# Patient Record
Sex: FEMALE | Race: BLACK OR AFRICAN AMERICAN | Employment: FULL TIME | URBAN - METROPOLITAN AREA
[De-identification: names, ages, dates, MRNs, and addresses within clinical notes are randomized per-mention and may not be internally consistent; named-entity substitution may affect disease eponyms.]

---

## 2017-03-02 DIAGNOSIS — J45.909 ASTHMA IN PEDIATRIC PATIENT, UNSPECIFIED ASTHMA SEVERITY, UNCOMPLICATED: ICD-10-CM

## 2017-03-03 RX ORDER — ALBUTEROL SULFATE 90 UG/1
AEROSOL, METERED RESPIRATORY (INHALATION)
Qty: 18 INHALER | Refills: 0 | OUTPATIENT
Start: 2017-03-03

## 2017-03-08 NOTE — TELEPHONE ENCOUNTER
Pt needs to be called and informed that an appointment is necessary. I am not currently in the office and will route to [facility] to call pt.   Pt's last appointment was w/ Dr. Tonio Lao who is not a regular physician of [facility]

## 2018-07-20 ENCOUNTER — HOSPITAL ENCOUNTER (OUTPATIENT)
Dept: LAB | Age: 23
Discharge: HOME | End: 2018-07-20

## 2019-03-10 ENCOUNTER — HOSPITAL ENCOUNTER (EMERGENCY)
Age: 24
Discharge: HOME | End: 2019-03-10
Attending: STUDENT IN AN ORGANIZED HEALTH CARE EDUCATION/TRAINING PROGRAM
Payer: MEDICAID

## 2019-03-10 VITALS
TEMPERATURE: 99.3 F | BODY MASS INDEX: 47.09 KG/M2 | DIASTOLIC BLOOD PRESSURE: 90 MMHG | RESPIRATION RATE: 20 BRPM | OXYGEN SATURATION: 100 % | WEIGHT: 293 LBS | HEART RATE: 110 BPM | SYSTOLIC BLOOD PRESSURE: 144 MMHG | HEIGHT: 66 IN

## 2019-03-10 DIAGNOSIS — R11.10 VOMITING AND DIARRHEA: Primary | ICD-10-CM

## 2019-03-10 DIAGNOSIS — R19.7 VOMITING AND DIARRHEA: Primary | ICD-10-CM

## 2019-03-10 LAB
ALBUMIN SERPL-MCNC: 3.7 G/DL (ref 3.5–5)
ALBUMIN/GLOB SERPL: 0.9 {RATIO} (ref 1.1–2.2)
ALP SERPL-CCNC: 56 U/L (ref 45–117)
ALT SERPL-CCNC: 28 U/L (ref 12–78)
ANION GAP SERPL CALC-SCNC: 8 MMOL/L (ref 5–15)
APPEARANCE UR: ABNORMAL
AST SERPL-CCNC: 23 U/L (ref 15–37)
BACTERIA URNS QL MICRO: NEGATIVE /HPF
BASOPHILS # BLD: 0 K/UL (ref 0–0.1)
BASOPHILS NFR BLD: 0 % (ref 0–1)
BILIRUB SERPL-MCNC: 0.4 MG/DL (ref 0.2–1)
BILIRUB UR QL: NEGATIVE
BUN SERPL-MCNC: 13 MG/DL (ref 6–20)
BUN/CREAT SERPL: 16 (ref 12–20)
CALCIUM SERPL-MCNC: 8.2 MG/DL (ref 8.5–10.1)
CHLORIDE SERPL-SCNC: 104 MMOL/L (ref 97–108)
CO2 SERPL-SCNC: 26 MMOL/L (ref 21–32)
COLOR UR: ABNORMAL
CREAT SERPL-MCNC: 0.83 MG/DL (ref 0.55–1.02)
DIFFERENTIAL METHOD BLD: ABNORMAL
EOSINOPHIL # BLD: 0.1 K/UL (ref 0–0.4)
EOSINOPHIL NFR BLD: 1 % (ref 0–7)
EPITH CASTS URNS QL MICRO: NORMAL /LPF
ERYTHROCYTE [DISTWIDTH] IN BLOOD BY AUTOMATED COUNT: 12.5 % (ref 11.5–14.5)
GLOBULIN SER CALC-MCNC: 4.1 G/DL (ref 2–4)
GLUCOSE SERPL-MCNC: 87 MG/DL (ref 65–100)
GLUCOSE UR STRIP.AUTO-MCNC: NEGATIVE MG/DL
HCG UR QL: NEGATIVE
HCT VFR BLD AUTO: 41.6 % (ref 35–47)
HGB BLD-MCNC: 13.6 G/DL (ref 11.5–16)
HGB UR QL STRIP: ABNORMAL
IMM GRANULOCYTES # BLD AUTO: 0 K/UL (ref 0–0.04)
IMM GRANULOCYTES NFR BLD AUTO: 0 % (ref 0–0.5)
KETONES UR QL STRIP.AUTO: NEGATIVE MG/DL
LEUKOCYTE ESTERASE UR QL STRIP.AUTO: ABNORMAL
LIPASE SERPL-CCNC: 117 U/L (ref 73–393)
LYMPHOCYTES # BLD: 1 K/UL (ref 0.8–3.5)
LYMPHOCYTES NFR BLD: 8 % (ref 12–49)
MAGNESIUM SERPL-MCNC: 1.7 MG/DL (ref 1.6–2.4)
MCH RBC QN AUTO: 31.7 PG (ref 26–34)
MCHC RBC AUTO-ENTMCNC: 32.7 G/DL (ref 30–36.5)
MCV RBC AUTO: 97 FL (ref 80–99)
MONOCYTES # BLD: 0.8 K/UL (ref 0–1)
MONOCYTES NFR BLD: 7 % (ref 5–13)
NEUTS SEG # BLD: 10.5 K/UL (ref 1.8–8)
NEUTS SEG NFR BLD: 84 % (ref 32–75)
NITRITE UR QL STRIP.AUTO: NEGATIVE
NRBC # BLD: 0 K/UL (ref 0–0.01)
NRBC BLD-RTO: 0 PER 100 WBC
PH UR STRIP: 5 [PH] (ref 5–8)
PLATELET # BLD AUTO: 322 K/UL (ref 150–400)
PMV BLD AUTO: 10.2 FL (ref 8.9–12.9)
POTASSIUM SERPL-SCNC: 3.8 MMOL/L (ref 3.5–5.1)
PROT SERPL-MCNC: 7.8 G/DL (ref 6.4–8.2)
PROT UR STRIP-MCNC: NEGATIVE MG/DL
RBC # BLD AUTO: 4.29 M/UL (ref 3.8–5.2)
RBC #/AREA URNS HPF: NORMAL /HPF (ref 0–5)
SODIUM SERPL-SCNC: 138 MMOL/L (ref 136–145)
SP GR UR REFRACTOMETRY: 1.03 (ref 1–1.03)
UROBILINOGEN UR QL STRIP.AUTO: 0.2 EU/DL (ref 0.2–1)
WBC # BLD AUTO: 12.4 K/UL (ref 3.6–11)
WBC URNS QL MICRO: NORMAL /HPF (ref 0–4)

## 2019-03-10 PROCEDURE — 74011000250 HC RX REV CODE- 250: Performed by: STUDENT IN AN ORGANIZED HEALTH CARE EDUCATION/TRAINING PROGRAM

## 2019-03-10 PROCEDURE — 36415 COLL VENOUS BLD VENIPUNCTURE: CPT

## 2019-03-10 PROCEDURE — 96375 TX/PRO/DX INJ NEW DRUG ADDON: CPT

## 2019-03-10 PROCEDURE — 74011250637 HC RX REV CODE- 250/637: Performed by: STUDENT IN AN ORGANIZED HEALTH CARE EDUCATION/TRAINING PROGRAM

## 2019-03-10 PROCEDURE — 81001 URINALYSIS AUTO W/SCOPE: CPT

## 2019-03-10 PROCEDURE — 74011250636 HC RX REV CODE- 250/636: Performed by: STUDENT IN AN ORGANIZED HEALTH CARE EDUCATION/TRAINING PROGRAM

## 2019-03-10 PROCEDURE — 80053 COMPREHEN METABOLIC PANEL: CPT

## 2019-03-10 PROCEDURE — 85025 COMPLETE CBC W/AUTO DIFF WBC: CPT

## 2019-03-10 PROCEDURE — 96374 THER/PROPH/DIAG INJ IV PUSH: CPT

## 2019-03-10 PROCEDURE — 83735 ASSAY OF MAGNESIUM: CPT

## 2019-03-10 PROCEDURE — 96361 HYDRATE IV INFUSION ADD-ON: CPT

## 2019-03-10 PROCEDURE — 99283 EMERGENCY DEPT VISIT LOW MDM: CPT

## 2019-03-10 PROCEDURE — 81025 URINE PREGNANCY TEST: CPT

## 2019-03-10 PROCEDURE — 83690 ASSAY OF LIPASE: CPT

## 2019-03-10 RX ORDER — ONDANSETRON 2 MG/ML
4 INJECTION INTRAMUSCULAR; INTRAVENOUS
Status: COMPLETED | OUTPATIENT
Start: 2019-03-10 | End: 2019-03-10

## 2019-03-10 RX ORDER — KETOROLAC TROMETHAMINE 30 MG/ML
30 INJECTION, SOLUTION INTRAMUSCULAR; INTRAVENOUS
Status: COMPLETED | OUTPATIENT
Start: 2019-03-10 | End: 2019-03-10

## 2019-03-10 RX ORDER — FAMOTIDINE 10 MG/ML
20 INJECTION INTRAVENOUS
Status: COMPLETED | OUTPATIENT
Start: 2019-03-10 | End: 2019-03-10

## 2019-03-10 RX ORDER — ONDANSETRON 4 MG/1
4 TABLET, FILM COATED ORAL
Qty: 12 TAB | Refills: 0 | Status: SHIPPED | OUTPATIENT
Start: 2019-03-10 | End: 2021-01-26 | Stop reason: ALTCHOICE

## 2019-03-10 RX ORDER — NORGESTIMATE AND ETHINYL ESTRADIOL 0.25-0.035
1 KIT ORAL DAILY
COMMUNITY

## 2019-03-10 RX ORDER — FAMOTIDINE 20 MG/1
20 TABLET, FILM COATED ORAL 2 TIMES DAILY
Qty: 20 TAB | Refills: 0 | Status: SHIPPED | OUTPATIENT
Start: 2019-03-10 | End: 2019-03-20

## 2019-03-10 RX ADMIN — ONDANSETRON 4 MG: 2 INJECTION INTRAMUSCULAR; INTRAVENOUS at 19:07

## 2019-03-10 RX ADMIN — FAMOTIDINE 20 MG: 10 INJECTION, SOLUTION INTRAVENOUS at 20:22

## 2019-03-10 RX ADMIN — KETOROLAC TROMETHAMINE 30 MG: 30 INJECTION INTRAMUSCULAR; INTRAVENOUS at 19:08

## 2019-03-10 RX ADMIN — LIDOCAINE HYDROCHLORIDE 40 ML: 20 SOLUTION ORAL; TOPICAL at 20:20

## 2019-03-10 RX ADMIN — SODIUM CHLORIDE, SODIUM LACTATE, POTASSIUM CHLORIDE, AND CALCIUM CHLORIDE 1000 ML: 600; 310; 30; 20 INJECTION, SOLUTION INTRAVENOUS at 19:07

## 2019-03-10 NOTE — ED NOTES
Plan of care and all test/meds ordered explained to pt. Good understanding and agreement with plan was verbalized. IVF to gravity without difficulty. Call bell within reach.

## 2019-03-10 NOTE — LETTER
[facility] EMERGENCY DEPT 
[address] [place] [address] [phone] Work/School Note Date: 3/10/2019 To Whom It May concern: 
 
Adriana Garcia was seen and treated today in the emergency room by the following provider(s): 
Attending Provider: Maynor Bach MD.   
 
Adriana Garcia may return to school on 03/11/19. Sincerely, Deliah Kehr, MD

## 2019-03-10 NOTE — ED PROVIDER NOTES
26 y.o. female with past medical history significant for Morbid obesity and Asthma who presents from home via private vehicle with chief complaint of abdominal pain. Pt reports onset \"a day and half ago\" of \"6/10\" L sided abdominal pain accompanied by nausea, vomiting x2, diarrhea x4, and HA. Pt reports last episode of vomiting was \"25 minutes ago\". Pt reports she was not able to tolerate PO today. Pt denies previous history of current symptoms. 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 Pt denies any history of abdominal surgeries. Pt denies any dysuria, hematemesis, lightheadedness, cough, SOB or sore throat. There are no other acute medical concerns at this time. Social hx: Non smoker; No EtOH use    PCP: Mack Burks MD    Note written by Moses Stafford, as dictated by Julio Oliva MD 6:20 PM        The history is provided by the patient. No  was used.         Past Medical History:   Diagnosis Date    Asthma     Morbid obesity with BMI of 50.0-59.9, adult ([facility] 5/4/2015       Past Surgical History:   Procedure Laterality Date    HX HEENT      keloid removal         Family History:   Problem Relation Age of Onset    Depression Sister        Social History     Socioeconomic History    Marital status: UNKNOWN     Spouse name: Not on file    Number of children: Not on file    Years of education: Not on file    Highest education level: Not on file   Social Needs    Financial resource strain: Not on file    Food insecurity - worry: Not on file    Food insecurity - inability: Not on file   [employer] needs - medical: Not on file   [employer] needs - non-medical: Not on file   Occupational History    Not on file   Tobacco Use    Smoking status: Never Smoker   Substance and Sexual Activity    Alcohol use: No    Drug use: No    Sexual activity: Not on file   Other Topics Concern    Not on file   Social History Narrative  Not on file         ALLERGIES: Patient has no known allergies. Review of Systems   Constitutional: Negative for chills and fever. HENT: Negative for sore throat. Eyes: Negative for photophobia. Respiratory: Negative for cough and shortness of breath. Cardiovascular: Negative for chest pain. Gastrointestinal: Positive for abdominal distention, diarrhea, nausea and vomiting. Negative for abdominal pain. Genitourinary: Negative for dysuria. Musculoskeletal: Negative for back pain. Neurological: Positive for headaches. Negative for light-headedness. Psychiatric/Behavioral: Negative for confusion. All other systems reviewed and are negative. There were no vitals filed for this visit. Physical Exam   Constitutional: No distress. Pt appears comfortable and is pleasant. Pt is morbidly obese. HENT:   Head: Normocephalic. Eyes: Pupils are equal, round, and reactive to light. Cardiovascular: Normal rate, regular rhythm and intact distal pulses. Pulmonary/Chest: Effort normal.   Abdominal: She exhibits no distension. There is tenderness in the left upper quadrant. There is no rebound and no guarding. Neurological: She is alert. Skin: Skin is warm. Psychiatric: Her behavior is normal.   Nursing note and vitals reviewed. Note written by Moses Durham, as dictated by Ray Murguia MD 6:20 PM    PANCHITO Thompson is a 26 y.o. female presenting with LUQ, intermittent emesis and diarrhea for over 24 hrs, no fevers, benign abdominal exam.    Differential includes gastroenteritis, PUD, pancreatitis, less likely cholecystitis or bowel obstruction. Course: stable. Dispo: home. 7:30 PM  Return precautions for worsening symptoms, specifically inability to tolerate PO, high fevers, constipation were explained verbally and in writing. Pt was asked to return to the ED immediately for any new, worsening or concerning symptoms.  Pt was in agreement, endorsed understanding, and questions were answered. Dalila Ruano was instructed to call Suzette Ricardo MD for an urgent hospital follow up appointment. Malik Alberts M.D.       Procedures

## 2019-03-11 NOTE — DISCHARGE INSTRUCTIONS
Drink plenty of fluids to prevent dehydration, enough so that your urine is light yellow or clear like water. Choose water and other caffeine-free clear liquids until you feel better. If you have kidney, heart, or liver disease and have to limit fluids, talk with your doctor before you increase your fluid intake. Drink fluids slowly, in frequent, small amounts, because drinking too much too fast can cause vomiting. Begin eating mild foods, such as dry toast, yogurt, applesauce, bananas, and rice. Avoid spicy, hot, or high-fat foods, and do not drink alcohol or caffeine for a day or two. Do not drink milk or eat ice cream until you are feeling better. How to prevent gastroenteritis  Keep hot foods hot and cold foods cold. Do not eat meats, dressings, salads, or other foods that have been kept at room temperature for more than 2 hours. Use a thermometer to check your refrigerator. It should be between 34°F and 40°F.  Defrost meats in the refrigerator or microwave, not on the kitchen counter. Keep your hands and your kitchen clean. Wash your hands, cutting boards, and countertops with hot soapy water frequently. Cook meat until it is well done. Do not eat raw eggs or uncooked sauces made with raw eggs. Do not take chances. If food looks or tastes spoiled, throw it out. When should you call for help? Call 911 anytime you think you may need emergency care. For example, call if:    You vomit blood or what looks like coffee grounds. You passed out (lost consciousness). You pass maroon or very bloody stools. Call your doctor now or seek immediate medical care if:    You have severe belly pain. You have signs of needing more fluids. You have sunken eyes, a dry mouth, and pass only a little dark urine. You feel like you are going to faint. You have increased belly pain that does not go away in 1 to 2 days. You have new or increased nausea, or you are vomiting.      You have a new or higher fever.     Your stools are black and tarlike or have streaks of blood. Watch closely for changes in your health, and be sure to contact your doctor if:    You are dizzy or lightheaded. You urinate less than usual, or your urine is dark yellow or brown. You do not feel better with each day that goes by.

## 2019-03-11 NOTE — PROGRESS NOTES
[facility]: MED RECONCILIATION    Medications added:     · Sprintec or generic equivalent (oral contraceptives) - Started week of placebo pills on 3/9/19. Next active pill will be due on 3/16/19. Medications removed:    · Cyclobenzaprine - old Rx  · Flonase    Medications adjusted:    · Ibuprofen 400 mg PO Q8H PRN cramps    Information obtained from: patient, Rx query    Significant PMH/Disease States:   Past Medical History:   Diagnosis Date    Asthma     Morbid obesity with BMI of 50.0-59.9, adult ([facility] 5/4/2015     Chief Complaint for this Admission:   Chief Complaint   Patient presents with    Abdominal Pain     Allergies: Updated allergy list to include codeine (itching)    Prior to Admission Medications:     Medication Documentation Review Audit       Reviewed by Deny Hatch, PHARMD (Pharmacist) on 03/10/19 at 2004      Medication Sig Documenting Provider Last Dose Status Taking? albuterol (PROVENTIL HFA, VENTOLIN HFA, PROAIR HFA) 90 mcg/actuation inhaler Take 2 Puffs by inhalation every six (6) hours as needed for Wheezing. Diamond Gilbert MD  Active Yes   ibuprofen (ADVIL) 200 mg tablet Take 400 mg by mouth every eight (8) hours as needed (cramps). Provider, Historical  Active Yes   norgestimate-ethinyl estradiol (SPRINTEC, 28,) 0.25-35 mg-mcg tab Take 1 Tab by mouth daily. Provider, Historical 3/10/2019 am Active Yes           Med Note Cameron Dunaway, [address] [place]   Sun Mar 10, 2019  8:04 PM) Started week of placebo pills on 3/9/19. Next due active pill will be due 3/16/19. Thank you for the consult,  Arnoldo AGUAYO [facility]

## 2019-03-11 NOTE — ED NOTES
Provider has reviewed discharge instructions with the patient. The patient verbalized understanding. Pt ambulates toward exit without difficulty.

## 2019-04-25 ENCOUNTER — HOSPITAL ENCOUNTER (EMERGENCY)
Age: 24
Discharge: HOME | End: 2019-04-25
Attending: EMERGENCY MEDICINE | Admitting: EMERGENCY MEDICINE
Payer: MEDICAID

## 2019-04-25 VITALS
WEIGHT: 293 LBS | TEMPERATURE: 98.9 F | RESPIRATION RATE: 18 BRPM | HEART RATE: 97 BPM | DIASTOLIC BLOOD PRESSURE: 86 MMHG | HEIGHT: 67 IN | SYSTOLIC BLOOD PRESSURE: 186 MMHG | OXYGEN SATURATION: 100 % | BODY MASS INDEX: 45.99 KG/M2

## 2019-04-25 DIAGNOSIS — M54.50 ACUTE LOW BACK PAIN WITHOUT SCIATICA, UNSPECIFIED BACK PAIN LATERALITY: Primary | ICD-10-CM

## 2019-04-25 LAB
APPEARANCE UR: CLEAR
BACTERIA URNS QL MICRO: NEGATIVE /HPF
BILIRUB UR QL: NEGATIVE
COLOR UR: ABNORMAL
EPITH CASTS URNS QL MICRO: ABNORMAL /LPF
GLUCOSE UR STRIP.AUTO-MCNC: NEGATIVE MG/DL
HCG UR QL: NEGATIVE
HGB UR QL STRIP: ABNORMAL
HYALINE CASTS URNS QL MICRO: ABNORMAL /LPF (ref 0–5)
KETONES UR QL STRIP.AUTO: ABNORMAL MG/DL
LEUKOCYTE ESTERASE UR QL STRIP.AUTO: NEGATIVE
NITRITE UR QL STRIP.AUTO: NEGATIVE
PH UR STRIP: 5.5 [PH] (ref 5–8)
PROT UR STRIP-MCNC: NEGATIVE MG/DL
RBC #/AREA URNS HPF: ABNORMAL /HPF (ref 0–5)
SP GR UR REFRACTOMETRY: 1.03 (ref 1–1.03)
UR CULT HOLD, URHOLD: NORMAL
UROBILINOGEN UR QL STRIP.AUTO: 1 EU/DL (ref 0.2–1)
WBC URNS QL MICRO: ABNORMAL /HPF (ref 0–4)

## 2019-04-25 PROCEDURE — 74011250636 HC RX REV CODE- 250/636: Performed by: PHYSICIAN ASSISTANT

## 2019-04-25 PROCEDURE — 96372 THER/PROPH/DIAG INJ SC/IM: CPT

## 2019-04-25 PROCEDURE — 99283 EMERGENCY DEPT VISIT LOW MDM: CPT

## 2019-04-25 PROCEDURE — 81001 URINALYSIS AUTO W/SCOPE: CPT

## 2019-04-25 PROCEDURE — 81025 URINE PREGNANCY TEST: CPT

## 2019-04-25 RX ORDER — KETOROLAC TROMETHAMINE 30 MG/ML
30 INJECTION, SOLUTION INTRAMUSCULAR; INTRAVENOUS
Status: COMPLETED | OUTPATIENT
Start: 2019-04-25 | End: 2019-04-25

## 2019-04-25 RX ORDER — CYCLOBENZAPRINE HCL 10 MG
10 TABLET ORAL
Qty: 8 TAB | Refills: 0 | Status: SHIPPED | OUTPATIENT
Start: 2019-04-25 | End: 2021-01-26 | Stop reason: ALTCHOICE

## 2019-04-25 RX ORDER — IBUPROFEN 800 MG/1
800 TABLET ORAL
Qty: 20 TAB | Refills: 0 | Status: SHIPPED | OUTPATIENT
Start: 2019-04-25

## 2019-04-25 RX ADMIN — KETOROLAC TROMETHAMINE 30 MG: 30 INJECTION, SOLUTION INTRAMUSCULAR at 21:36

## 2019-04-25 NOTE — LETTER
Mary Carcamo 
[facility] EMERGENCY DEPT 
[address] [facility] [place] [address] [phone] Work/School Note Date: 4/25/2019 To Whom It May concern: 
 
Manual Sarah was seen and treated today in the emergency room by the following provider(s): 
Attending Provider: Arvind Cannon DO Physician Assistant: Sandra Read PA-C. Nicholas Smith may return to school on 4/26/2019. Sincerely, Jose Abrams RN

## 2019-04-26 NOTE — DISCHARGE INSTRUCTIONS
Patient Education        Back Pain: Care Instructions  Your Care Instructions    Back pain has many possible causes. It is often related to problems with muscles and ligaments of the back. It may also be related to problems with the nerves, discs, or bones of the back. Moving, lifting, standing, sitting, or sleeping in an awkward way can strain the back. Sometimes you don't notice the injury until later. Arthritis is another common cause of back pain. Although it may hurt a lot, back pain usually improves on its own within several weeks. Most people recover in 12 weeks or less. Using good home treatment and being careful not to stress your back can help you feel better sooner. Follow-up care is a key part of your treatment and safety. Be sure to make and go to all appointments, and call your doctor if you are having problems. It's also a good idea to know your test results and keep a list of the medicines you take. How can you care for yourself at home? · Sit or lie in positions that are most comfortable and reduce your pain. Try one of these positions when you lie down:  ? Lie on your back with your knees bent and supported by large pillows. ? Lie on the floor with your legs on the seat of a sofa or chair. ? Lie on your side with your knees and hips bent and a pillow between your legs. ? Lie on your stomach if it does not make pain worse. · Do not sit up in bed, and avoid soft couches and twisted positions. Bed rest can help relieve pain at first, but it delays healing. Avoid bed rest after the first day of back pain. · Change positions every 30 minutes. If you must sit for long periods of time, take breaks from sitting. Get up and walk around, or lie in a comfortable position. · Try using a heating pad on a low or medium setting for 15 to 20 minutes every 2 or 3 hours. Try a warm shower in place of one session with the heating pad. · You can also try an ice pack for 10 to 15 minutes every 2 to 3 hours. Put a thin cloth between the ice pack and your skin. · Take pain medicines exactly as directed. ? If the doctor gave you a prescription medicine for pain, take it as prescribed. ? If you are not taking a prescription pain medicine, ask your doctor if you can take an over-the-counter medicine. · Take short walks several times a day. You can start with 5 to 10 minutes, 3 or 4 times a day, and work up to longer walks. Walk on level surfaces and avoid hills and stairs until your back is better. · Return to work and other activities as soon as you can. Continued rest without activity is usually not good for your back. · To prevent future back pain, do exercises to stretch and strengthen your back and stomach. Learn how to use good posture, safe lifting techniques, and proper body mechanics. When should you call for help? Call your doctor now or seek immediate medical care if:    · You have new or worsening numbness in your legs.     · You have new or worsening weakness in your legs. (This could make it hard to stand up.)     · You lose control of your bladder or bowels.    Watch closely for changes in your health, and be sure to contact your doctor if:    · You have a fever, lose weight, or don't feel well.     · You do not get better as expected. Where can you learn more? Go to http://vito-augie.info/. Enter M475 in the search box to learn more about \"Back Pain: Care Instructions. \"  Current as of: September 20, 2018  Content Version: 11.9  © 6767-3337 [employer]. Care instructions adapted under license by [employer] (which disclaims liability or warranty for this information). If you have questions about a medical condition or this instruction, always ask your healthcare professional. [place] [address] any warranty or liability for your use of this information.          Patient Education        Learning About Relief for Back Pain  What is back tension and strain? Back strain happens when you overstretch, or pull, a muscle in your back. You may hurt your back in an accident or when you exercise or lift something. Most back pain will get better with rest and time. You can take care of yourself at home to help your back heal.  What can you do first to relieve back pain? When you first feel back pain, try these steps:  · Walk. Take a short walk (10 to 20 minutes) on a level surface (no slopes, hills, or stairs) every 2 to 3 hours. Walk only distances you can manage without pain, especially leg pain. · Relax. Find a comfortable position for rest. Some people are comfortable on the floor or a medium-firm bed with a small pillow under their head and another under their knees. Some people prefer to lie on their side with a pillow between their knees. Don't stay in one position for too long. · Try heat or ice. Try using a heating pad on a low or medium setting, or take a warm shower, for 15 to 20 minutes every 2 to 3 hours. Or you can buy single-use heat wraps that last up to 8 hours. You can also try an ice pack for 10 to 15 minutes every 2 to 3 hours. You can use an ice pack or a bag of frozen vegetables wrapped in a thin towel. There is not strong evidence that either heat or ice will help, but you can try them to see if they help. You may also want to try switching between heat and cold. · Take pain medicine exactly as directed. ? If the doctor gave you a prescription medicine for pain, take it as prescribed. ? If you are not taking a prescription pain medicine, ask your doctor if you can take an over-the-counter medicine. What else can you do? · Stretch and exercise. Exercises that increase flexibility may relieve your pain and make it easier for your muscles to keep your spine in a good, neutral position. And don't forget to keep walking. · Do self-massage.  You can use self-massage to unwind after work or school or to energize yourself in the morning. You can easily massage your feet, hands, or neck. Self-massage works best if you are in comfortable clothes and are sitting or lying in a comfortable position. Use oil or lotion to massage bare skin. · Reduce stress. Back pain can lead to a vicious Teller: Distress about the pain tenses the muscles in your back, which in turn causes more pain. Learn how to relax your mind and your muscles to lower your stress. Where can you learn more? Go to http://vito-augie.info/. Enter R190 in the search box to learn more about \"Learning About Relief for Back Pain. \"  Current as of: September 20, 2018  Content Version: 11.9  © 8751-1318 [facility], [employer]. Care instructions adapted under license by [employer] (which disclaims liability or warranty for this information). If you have questions about a medical condition or this instruction, always ask your healthcare professional. [address] any warranty or liability for your use of this information.

## 2019-04-26 NOTE — ED PROVIDER NOTES
26 y.o. female with past medical history significant for asthma, morbid obesity, who presents from home with chief complaint of back pain. Pt states for the past 3 days she has had persistent back pain. She reports sharp shooting mid-back pain upon waking x3 days ago. She notes the pain is exacerbated with quick movements. She also reports the pain is worse with awaking and going to sleep at night. Her current Mattress is only 3year old. Pt reports taking x2 Advil yesterday without any relief of pain. Of note, the pt also plays the [place] which weighs about 45 lbs and has played for the past 12 years. She denies any history of back pain. She denies any recent injuries or falls to contribute to the pain. Pt specifically denies any radiation to her legs, abdominal pain, nausea, vomiting, diarrhea, dysuria, or urinary frequency. Last menstrual cycle was 4/15. There are no other acute medical concerns at this time. PSHx: Significant for keloid removal 
Social Hx: negative tobacco use, negative EtOH use, negative Illicit Drug use PCP: Rayray Garcia MD 
 
Note written by Moses Chavez, as dictated by Treasure Morse PA-C 9:02 PM 
 
The history is provided by the patient. No  was used. Past Medical History:  
Diagnosis Date  Asthma  Morbid obesity with BMI of 50.0-59.9, adult ([facility] 5/4/2015 Past Surgical History:  
Procedure Laterality Date  HX HEENT    
 keloid removal  
 
   
Family History:  
Problem Relation Age of Onset  Depression Sister Social History Socioeconomic History  Marital status: SINGLE Spouse name: Not on file  Number of children: Not on file  Years of education: Not on file  Highest education level: Not on file Occupational History  Not on file Social Needs  Financial resource strain: Not on file  Food insecurity:  
  Worry: Not on file Inability: Not on file  Transportation needs: Medical: Not on file Non-medical: Not on file Tobacco Use  Smoking status: Never Smoker Substance and Sexual Activity  Alcohol use: No  
 Drug use: No  
 Sexual activity: Not on file Lifestyle  Physical activity:  
  Days per week: Not on file Minutes per session: Not on file  Stress: Not on file Relationships  Social connections:  
  Talks on phone: Not on file Gets together: Not on file Attends Y[employer] service: Not on file Active member of club or organization: Not on file Attends meetings of clubs or organizations: Not on file Relationship status: Not on file  Intimate partner violence:  
  Fear of current or ex partner: Not on file Emotionally abused: Not on file Physically abused: Not on file Forced sexual activity: Not on file Other Topics Concern  Not on file Social History Narrative  Not on file ALLERGIES: Codeine Review of Systems Constitutional: Negative for chills and fever. Gastrointestinal: Negative for abdominal pain, diarrhea, nausea and vomiting. Genitourinary: Negative for dysuria and frequency. Musculoskeletal: Positive for back pain. All other systems reviewed and are negative. Vitals:  
 04/25/19 2026 BP: 186/86 Pulse: 97 Resp: 18 Temp: 98.9 °F (37.2 °C) SpO2: 100% Weight: 145.2 kg (320 lb) Height: 5' 7\" (1.702 m) Physical Exam  
Constitutional: She is oriented to person, place, and time. She appears well-developed and well-nourished. She is active. Non-toxic appearance. No distress. AA female elevated BMI HENT:  
Head: Normocephalic and atraumatic. Eyes: Pupils are equal, round, and reactive to light. Conjunctivae are normal. Right eye exhibits no discharge. Left eye exhibits no discharge. Neck: Normal range of motion and full passive range of motion without pain. No tracheal tenderness present. Cardiovascular: Normal rate, regular rhythm, normal heart sounds, intact distal pulses and normal pulses. Exam reveals no gallop and no friction rub. No murmur heard. Pulmonary/Chest: Effort normal and breath sounds normal. No respiratory distress. She has no wheezes. She has no rales. She exhibits no tenderness. Abdominal: Soft. Bowel sounds are normal. She exhibits no distension. There is no tenderness. There is no rebound and no guarding. Musculoskeletal: Normal range of motion. She exhibits no edema or tenderness. No midline spinous process TTP; Neg SLR. NVI throughout. Neurological: She is alert and oriented to person, place, and time. She has normal strength. No cranial nerve deficit or sensory deficit. Coordination normal.  
Skin: Skin is warm, dry and intact. Capillary refill takes less than 2 seconds. No abrasion and no rash noted. She is not diaphoretic. No erythema. Psychiatric: She has a normal mood and affect. Her speech is normal and behavior is normal. Cognition and memory are normal.  
Nursing note and vitals reviewed. MDM Number of Diagnoses or Management Options Diagnosis management comments:  
Ddx: back strain / sprain, UTI Amount and/or Complexity of Data Reviewed Clinical lab tests: ordered and reviewed Review and summarize past medical records: yes Patient Progress Patient progress: stable Procedures 9:44 PM 
Patient's results have been reviewed with them. Patient and/or family have verbally conveyed their understanding and agreement of the patient's signs, symptoms, diagnosis, treatment and prognosis and additionally agree to follow up as recommended or return to the Emergency Room should their condition change prior to follow-up.   Discharge instructions have also been provided to the patient with some educational information regarding their diagnosis as well a list of reasons why they would want to return to the ER prior to their follow-up appointment should their condition change.

## 2019-11-25 ENCOUNTER — HOSPITAL ENCOUNTER (EMERGENCY)
Age: 24
Discharge: HOME | End: 2019-11-25
Attending: EMERGENCY MEDICINE
Payer: MEDICAID

## 2019-11-25 ENCOUNTER — APPOINTMENT (OUTPATIENT)
Dept: VASCULAR SURGERY | Age: 24
End: 2019-11-25
Attending: PHYSICIAN ASSISTANT
Payer: MEDICAID

## 2019-11-25 VITALS
DIASTOLIC BLOOD PRESSURE: 85 MMHG | WEIGHT: 290 LBS | OXYGEN SATURATION: 100 % | BODY MASS INDEX: 46.61 KG/M2 | RESPIRATION RATE: 18 BRPM | SYSTOLIC BLOOD PRESSURE: 139 MMHG | HEIGHT: 66 IN | TEMPERATURE: 99.2 F | HEART RATE: 98 BPM

## 2019-11-25 DIAGNOSIS — M25.561 ARTHRALGIA OF RIGHT LOWER LEG: Primary | ICD-10-CM

## 2019-11-25 PROCEDURE — 99283 EMERGENCY DEPT VISIT LOW MDM: CPT

## 2019-11-25 PROCEDURE — 93971 EXTREMITY STUDY: CPT

## 2019-11-25 RX ORDER — PREDNISONE 10 MG/1
TABLET ORAL
Qty: 48 TAB | Refills: 0 | Status: SHIPPED | OUTPATIENT
Start: 2019-11-25 | End: 2021-01-26 | Stop reason: ALTCHOICE

## 2019-11-25 RX ORDER — METHOCARBAMOL 500 MG/1
500 TABLET, FILM COATED ORAL 4 TIMES DAILY
Qty: 30 TAB | Refills: 0 | Status: SHIPPED | OUTPATIENT
Start: 2019-11-25 | End: 2019-12-05

## 2019-11-25 NOTE — DISCHARGE INSTRUCTIONS
Patient Education        Musculoskeletal Pain: Care Instructions  Your Care Instructions    Different problems with the bones, muscles, nerves, ligaments, and tendons in the body can cause pain. One or more areas of your body may ache or burn. Or they may feel tired, stiff, or sore. The medical term for this type of pain is musculoskeletal pain. It can have many different causes. Sometimes the pain is caused by an injury such as a strain or sprain. Or you might have pain from using one part of your body in the same way over and over again. This is called overuse. In some cases, the cause of the pain is another health problem such as arthritis or fibromyalgia. The doctor will examine you and ask you questions about your health to help find the cause of your pain. Blood tests or imaging tests like an X-ray may also be helpful. But sometimes doctors can't find a cause of the pain. Treatment depends on your symptoms and the cause of the pain, if known. The doctor has checked you carefully, but problems can develop later. If you notice any problems or new symptoms, get medical treatment right away. Follow-up care is a key part of your treatment and safety. Be sure to make and go to all appointments, and call your doctor if you are having problems. It's also a good idea to know your test results and keep a list of the medicines you take. How can you care for yourself at home? · Rest until you feel better. · Do not do anything that makes the pain worse. Return to exercise gradually if you feel better and your doctor says it's okay. · Be safe with medicines. Read and follow all instructions on the label. ? If the doctor gave you a prescription medicine for pain, take it as prescribed. ? If you are not taking a prescription pain medicine, ask your doctor if you can take an over-the-counter medicine. · Put ice or a cold pack on the area for 10 to 20 minutes at a time to ease pain.  Put a thin cloth between the ice and your skin. When should you call for help? Call your doctor now or seek immediate medical care if:    · You have new pain, or your pain gets worse.     · You have new symptoms such as a fever, a rash, or chills.    Watch closely for changes in your health, and be sure to contact your doctor if:    · You do not get better as expected. Where can you learn more? Go to http://vito-augie.info/. Enter L312 in the search box to learn more about \"Musculoskeletal Pain: Care Instructions. \"  Current as of: March 28, 2019  Content Version: 12.2  © 4406-7713 [employer]. Care instructions adapted under license by [employer] (which disclaims liability or warranty for this information). If you have questions about a medical condition or this instruction, always ask your healthcare professional. [address] any warranty or liability for your use of this information.

## 2019-11-25 NOTE — ED PROVIDER NOTES
Francesca Cortes is a 22 y.o. female  who presents by private vehicle to ER with c/o Patient presents with:  Leg Pain  Patient presents with right leg pain x 1 month. Patient reports pain is in upper leg, occasionally lower leg. Patient reports right middle toes are numb. Patient denies any injury. Denies swelling or discoloration. Patient denies any injury. Patient reports lower back pain that started 8 months ago. She specifically denies any fevers, chills, nausea, vomiting, chest pain, shortness of breath, headache, rash, diarrhea, abdominal pain, urinary/bowel changes, sweating or weight loss. PCP: Shayne Milner MD   PMHx significant for: Past Medical History:  No date: Asthma  5/4/2015: Morbid obesity with BMI of 50.0-59.9, adult ([facility])   PSHx significant for: Past Surgical History:  No date: HX HEENT      Comment:  keloid removal  Social Hx: Tobacco use: Social History    Tobacco Use      Smoking status: Never Smoker  ; EtOH use: The patient states she drinks 0 per week.; Illicit Drug use: Allergies:   -- Codeine -- Itching    There are no other complaints, changes or physical findings at this time.               Past Medical History:   Diagnosis Date    Asthma     [facility] 50.0-59.9[facility]       Past Surgical History:   Procedure Laterality Date    HX HEENT      keloid removal         Family History:   Problem Relation Age of Onset    Depression Sister        Social History     Socioeconomic History    Marital status: SINGLE     Spouse name: Not on file    Number of children: Not on file    Years of education: Not on file    Highest education level: Not on file   Occupational History    Not on file   Social Needs    Financial resource strain: Not on file    Food insecurity:     Worry: Not on file     Inability: Not on file    Transportation needs:     Medical: Not on file     Non-medical: Not on file   Tobacco Use    Smoking status: Never Smoker Substance and Sexual Activity    Alcohol use: No    Drug use: No    Sexual activity: Not on file   Lifestyle    Physical activity:     Days per week: Not on file     Minutes per session: Not on file    Stress: Not on file   Relationships    Social connections:     Talks on phone: Not on file     Gets together: Not on file     Attends Islam service: Not on file     Active member of club or organization: Not on file     Attends meetings of clubs or organizations: Not on file     Relationship status: Not on file    Intimate partner violence:     Fear of current or ex partner: Not on file     Emotionally abused: Not on file     Physically abused: Not on file     Forced sexual activity: Not on file   Other Topics Concern    Not on file   Social History Narrative    Not on file         ALLERGIES: Codeine    Review of Systems   Constitutional: Negative for activity change, chills and fever. HENT: Negative for congestion, rhinorrhea and sore throat. Respiratory: Negative for shortness of breath. Cardiovascular: Negative for chest pain and leg swelling. Gastrointestinal: Negative for abdominal pain, diarrhea, nausea and vomiting. Genitourinary: Negative for dysuria, vaginal bleeding and vaginal discharge. Musculoskeletal: Positive for myalgias. Negative for arthralgias. Neurological: Negative for dizziness. Psychiatric/Behavioral: Negative for confusion. All other systems reviewed and are negative. There were no vitals filed for this visit. Physical Exam  Constitutional:       Appearance: Normal appearance. She is well-developed. HENT:      Head: Normocephalic and atraumatic. Right Ear: External ear normal.      Left Ear: External ear normal.      Nose: Nose normal.      Mouth/Throat:      Pharynx: No oropharyngeal exudate. Eyes:      General: Lids are normal.         Right eye: No discharge. Left eye: No discharge.       Conjunctiva/sclera: Conjunctivae normal. Neck:      Musculoskeletal: Normal range of motion. Thyroid: No thyromegaly. Trachea: No tracheal deviation. Cardiovascular:      Rate and Rhythm: Normal rate and regular rhythm. Heart sounds: Normal heart sounds. Pulmonary:      Effort: Pulmonary effort is normal.      Breath sounds: Normal breath sounds. Abdominal:      General: Bowel sounds are normal.      Palpations: Abdomen is soft. Musculoskeletal: Normal range of motion. Comments: Spine palpated with out step off or crepitus. Non-TTP over spine. Full ROM to all extremities. All extremities are NVI. Patient ambulatory to exam room with out difficulty. Right lower extremity with no swelling, erythema or discoloration   Skin:     General: Skin is warm and dry. Neurological:      Mental Status: She is alert and oriented to person, place, and time. Psychiatric:         Judgment: Judgment normal.          MDM  Number of Diagnoses or Management Options  Arthralgia of right lower leg:   Diagnosis management comments: Assesment/Plan- 22 y.o. Patient presents with:  Leg Pain  differential includes: DVT, sciatica, muscle strain. Labs and imaging reviewed with no acute findings. Recommend PCP follow up. Patient educated on reasons to return to the ED.            Procedures

## 2021-01-26 ENCOUNTER — OFFICE VISIT (OUTPATIENT)
Dept: SURGERY | Age: 26
End: 2021-01-26
Payer: MEDICAID

## 2021-01-26 VITALS
SYSTOLIC BLOOD PRESSURE: 139 MMHG | HEART RATE: 99 BPM | BODY MASS INDEX: 47.09 KG/M2 | WEIGHT: 293 LBS | TEMPERATURE: 97.1 F | HEIGHT: 66 IN | DIASTOLIC BLOOD PRESSURE: 93 MMHG

## 2021-01-26 DIAGNOSIS — N64.52 NIPPLE DISCHARGE, BLOODY: Primary | ICD-10-CM

## 2021-01-26 PROCEDURE — 99203 OFFICE O/P NEW LOW 30 MIN: CPT | Performed by: SURGERY

## 2021-01-26 PROCEDURE — 76641 ULTRASOUND BREAST COMPLETE: CPT | Performed by: SURGERY

## 2021-01-26 RX ORDER — METFORMIN HYDROCHLORIDE 850 MG/1
TABLET ORAL
COMMUNITY
Start: 2021-01-20

## 2021-01-26 RX ORDER — DOXYCYCLINE 100 MG/1
CAPSULE ORAL
COMMUNITY
Start: 2021-01-13

## 2021-01-26 NOTE — PATIENT INSTRUCTIONS
Nipple Discharge: Care Instructions Your Care Instructions Fluid leaking from one or both nipples when you are not breastfeeding is called nipple discharge. Clear, cloudy, or white discharge that appears only when you press on your nipple is usually normal. The more the nipple is pressed or stimulated, the more fluid appears. Yellow, green, or brown discharge is not normal and may be a symptom of an infection or other problem. Spontaneous discharge appears without pressing or stimulating the nipple. This is not normal unless you are pregnant or breastfeeding. It may be a side effect of a medicine, or it may be caused by other health problems. The treatment of spontaneous nipple discharge depends on what is causing it. You may need additional tests to find out what is causing the nipple discharge. Follow-up care is a key part of your treatment and safety. Be sure to make and go to all appointments, and call your doctor if you are having problems. It's also a good idea to know your test results and keep a list of the medicines you take. How can you care for yourself at home? · If your doctor gave you medicine, take it exactly as prescribed. Call your doctor if you think you are having a problem with your medicine. · Wear a supportive bra, such as a sports bra or jog bra. · Avoid stimulating your breast until you have your follow-up appointment. When should you call for help? Call your doctor now or seek immediate medical care if: 
  · You have symptoms of a breast infection, such as: 
? Increased pain, swelling, redness, or warmth around a breast. 
? Red streaks extending from the breast. 
? Pus draining from a breast. 
? A fever. Watch closely for changes in your health, and be sure to contact your doctor if: 
  · You notice any changes in your breast or discharge.  
  · You do not get better as expected. Where can you learn more? Go to http://www.[employer].com/ Enter S876 in the search box to learn more about \"Nipple Discharge: Care Instructions. \" Current as of: June 26, 2019               Content Version: 12.6 © 8996-2164 [employer]. Care instructions adapted under license by [employer] (which disclaims liability or warranty for this information). If you have questions about a medical condition or this instruction, always ask your healthcare professional. [place] [address] any warranty or liability for your use of this information.

## 2021-01-26 NOTE — LETTER
1/26/2021 Patient: Adelso Mcgowan YOB: 1995 Date of Visit: 1/26/2021 Jj Bello [address] [facility] [address] [address] [address] In [employer] Kumar Draper MD 
[address] [facility] [address] [facility] [address] [address] [address] Fax: [phone] Dear MD Kumar Bello MD, Thank you for referring Ms. Adelso Mcgowan to [address] [place] [address] for evaluation. My notes for this consultation are attached. If you have questions, please do not hesitate to call me. I look forward to following your patient along with you.  
 
 
Sincerely, 
 
Ovidio Bartlett MD

## 2021-01-26 NOTE — PROGRESS NOTES
HISTORY OF PRESENT ILLNESS  Sabine Ayala is a 26 y.o. female. HPI    NEW ptient presents for consultation at the request of Juan Baldwin MD for LEFT bloody nipple discharge, one spontaneous episode last Tuesday in the shower. She squeezed her nipple, and she was able to get out a large amount of bloody discharge. Since then she can express fluid but no blood. She can always express discharge from the LEFT breast.  Used to be able to express discharge from the RIGHT. It was never spontaneous or bloody. She is not feeling any breast mass that she knows of, has no nipple or skin change other than some dry skin on the nipples. FH - no FH of breast/ovarian cancer. No breast imaging. Past Medical History:   Diagnosis Date    Asthma     Morbid obesity with BMI of 50.0-59.9, adult ([facility] 5/4/2015     Past Surgical History:   Procedure Laterality Date    HX HEENT      keloid removal      OB History    No obstetric history on file. Obstetric Comments   Menarche 13, LMP 1/22/2021, # of children 0, age of 1st delivery N/A, Hysterectomy/oophorectomy No/No, Breast bx No, history of breast feeding N/A, BCP Yes, currently, Hormone therapy No             Family History   Problem Relation Age of Onset    Depression Sister      Social History     Tobacco Use    Smoking status: Never Smoker    Smokeless tobacco: Never Used   Substance Use Topics    Alcohol use: No      Prior to Admission medications    Medication Sig Start Date End Date Taking? Authorizing Provider   metFORMIN (GLUCOPHAGE) 850 mg tablet  1/20/21  Yes Provider, Historical   doxycycline (VIBRAMYCIN) 100 mg capsule  1/13/21  Yes Provider, Historical   ibuprofen (MOTRIN) 800 mg tablet Take 1 Tab by mouth every eight (8) hours as needed for Pain (take with food). 4/25/19  Yes Willi Crane PA-C   norgestimate-ethinyl estradiol ([facility], ,) 0.25-35 mg-mcg tab Take 1 Tab by mouth daily.    Yes Provider, Historical   albuterol (PROVENTIL HFA, VENTOLIN HFA, PROAIR HFA) 90 mcg/actuation inhaler Take 2 Puffs by inhalation every six (6) hours as needed for Wheezing. 2/14/16   Mimi Fernandez MD      Allergies   Allergen Reactions    Codeine Itching     Very somnolent. ROS    Physical Exam  BREAST ULTRASOUND  Indication: LEFT breast nipple discharge  Technique: The LEFT breast and axilla were scanned using a high-frequency linear-array near-field transducer  Findings: No dilated subareolar milk ducts. No axillary lymphadenopathy  Impression: Normal breast tissue   Disposition: No worrisome finding on ultrasound  ASSESSMENT and PLAN    ICD-10-CM ICD-9-CM    1. Nipple discharge, bloody  N64.52 611.79       Total time spent with patient: 30 minutes   1. One episode bloody nipple discharge. Pt has chronic LEFT nipple discharge, always expressible. Isolated bloody discharge secondary to duct ectasia. No evidence of an intraductal mass. Follow up if discharge is spontaneous. 2. Pt is interested in a breast reduction. I gave her Dr Syed Rubin phone number  3. We talked about breast pimples and terminal ducts in the nipple, both benign processes.

## 2021-02-17 ENCOUNTER — APPOINTMENT (OUTPATIENT)
Dept: VASCULAR SURGERY | Age: 26
End: 2021-02-17
Attending: EMERGENCY MEDICINE
Payer: COMMERCIAL

## 2021-02-17 ENCOUNTER — HOSPITAL ENCOUNTER (EMERGENCY)
Age: 26
Discharge: HOME | End: 2021-02-17
Attending: EMERGENCY MEDICINE
Payer: COMMERCIAL

## 2021-02-17 VITALS
DIASTOLIC BLOOD PRESSURE: 85 MMHG | HEIGHT: 66 IN | OXYGEN SATURATION: 97 % | SYSTOLIC BLOOD PRESSURE: 155 MMHG | HEART RATE: 107 BPM | BODY MASS INDEX: 47.09 KG/M2 | TEMPERATURE: 97.9 F | WEIGHT: 293 LBS | RESPIRATION RATE: 18 BRPM

## 2021-02-17 DIAGNOSIS — M54.31 SCIATICA OF RIGHT SIDE: Primary | ICD-10-CM

## 2021-02-17 PROCEDURE — 99281 EMR DPT VST MAYX REQ PHY/QHP: CPT

## 2021-02-17 PROCEDURE — 93971 EXTREMITY STUDY: CPT

## 2021-02-17 RX ORDER — NAPROXEN 500 MG/1
500 TABLET ORAL 2 TIMES DAILY WITH MEALS
Qty: 30 TAB | Refills: 0 | Status: SHIPPED | OUTPATIENT
Start: 2021-02-17

## 2021-02-17 RX ORDER — CYCLOBENZAPRINE HCL 10 MG
10 TABLET ORAL
Qty: 20 TAB | Refills: 0 | Status: SHIPPED | OUTPATIENT
Start: 2021-02-17

## 2021-02-17 RX ORDER — METHYLPREDNISOLONE 4 MG/1
TABLET ORAL
Qty: 1 DOSE PACK | Refills: 0 | Status: SHIPPED | OUTPATIENT
Start: 2021-02-17

## 2021-02-18 NOTE — ED TRIAGE NOTES
Pt arrives ambulatory to triage for right lower back pain . And 2 days of right calf pain. States that area felt hot earlier; cool in triage. Area is sensitive to touch. States that difficulty to walk at times. Spoke with nurse hotline with insurance who referred pt here for DVT r/o.

## 2021-02-18 NOTE — ED PROVIDER NOTES
Patient is a 69-year-old female with past medical history significant for asthma, morbid obesity and chronic back pain who presents emergency department with right calf pain and was advised to come to the ER for an ultrasound to rule out DVT. She states she is never had a PE or DVT in the past.  She states states she does have a long history of back pain and sciatica type symptoms but admits that she is never seen a orthopedic surgeon before. Denies any recent injuries. Denies any rash. She states that she has increased pain when she bears weight on the right leg but this is a chronic and unchanged phenomenon. Denies any significant weakness, no urinary retention or incontinence.            Past Medical History:   Diagnosis Date    Asthma     Morbid obesity with BMI of 50.0-59.9, [facility]       Past Surgical History:   Procedure Laterality Date    HX HEENT      keloid removal         Family History:   Problem Relation Age of Onset    Depression Sister        Social History     Socioeconomic History    Marital status: SINGLE     Spouse name: Not on file    Number of children: Not on file    Years of education: Not on file    Highest education level: Not on file   Occupational History    Not on file   Social Needs    Financial resource strain: Not on file    Food insecurity     Worry: Not on file     Inability: Not on file    Transportation needs     Medical: Not on file     Non-medical: Not on file   Tobacco Use    Smoking status: Never Smoker    Smokeless tobacco: Never Used   Substance and Sexual Activity    Alcohol use: No    Drug use: No    Sexual activity: Not on file   Lifestyle    Physical activity     Days per week: Not on file     Minutes per session: Not on file    Stress: Not on file   Relationships    Social connections     Talks on phone: Not on file     Gets together: Not on file     Attends Episcopal service: Not on file     Active member of club or organization: Not on file     Attends meetings of clubs or organizations: Not on file     Relationship status: Not on file   • Intimate partner violence     Fear of current or ex partner: Not on file     Emotionally abused: Not on file     Physically abused: Not on file     Forced sexual activity: Not on file   Other Topics Concern   • Not on file   Social History Narrative   • Not on file         ALLERGIES: Codeine    Review of Systems   Constitutional: Negative for chills, diaphoresis and fever.   HENT: Negative for drooling.    Respiratory: Negative for shortness of breath.    Cardiovascular: Negative for chest pain.   Gastrointestinal: Negative for nausea and vomiting.   Musculoskeletal: Positive for back pain (Chronically). Negative for neck pain and neck stiffness.   Skin: Negative for rash.   Neurological: Negative for tremors, seizures, syncope, speech difficulty and weakness.       Vitals:    02/17/21 2026   BP: (!) 155/85   Pulse: (!) 107   Resp: 18   Temp: 97.9 °F (36.6 °C)   SpO2: 97%   Weight: (!) 176 kg (388 lb)   Height: 5' 6\" (1.676 m)            Physical Exam  Vitals signs and nursing note reviewed.   Constitutional:       Appearance: Normal appearance. She is obese. She is not ill-appearing, toxic-appearing or diaphoretic.   HENT:      Head: Atraumatic.      Right Ear: External ear normal.      Left Ear: External ear normal.   Eyes:      General: No scleral icterus.  Neck:      Musculoskeletal: Neck supple.   Cardiovascular:      Rate and Rhythm: Normal rate.      Pulses: Normal pulses.   Pulmonary:      Effort: Pulmonary effort is normal.   Musculoskeletal:      Right lower leg: No edema.      Left lower leg: No edema.   Skin:     General: Skin is warm.      Findings: No rash.   Neurological:      Mental Status: She is alert and oriented to person, place, and time.   Psychiatric:         Mood and Affect: Mood normal.         Behavior: Behavior normal.         Thought Content: Thought content normal.          Judgment: Judgment normal.          MDM  Number of Diagnoses or Management Options  Sciatica of right side: established and worsening  Diagnosis management comments: Patient advised close follow-up with orthospine due to her acute on chronic back pain along with likely sciatica. Given strict warning signs and symptoms to return.        Amount and/or Complexity of Data Reviewed  Tests in the radiology section of CPT®: ordered and reviewed    Patient Progress  Patient progress: stable         Procedures

## 2021-02-18 NOTE — DISCHARGE INSTRUCTIONS
Return to the emergency department for fever, rash, difficulty urinating, incontinence of urine or stool or any other concern.

## 2021-03-05 DIAGNOSIS — M54.16 LUMBAR RADICULITIS: Primary | ICD-10-CM

## 2021-03-05 RX ORDER — GABAPENTIN 300 MG/1
300 CAPSULE ORAL
Qty: 30 CAP | Refills: 1 | Status: SHIPPED | OUTPATIENT
Start: 2021-03-05

## 2021-03-05 NOTE — PROGRESS NOTES
Medication note   [facility] are currently down. Damon Nohemigracia is being seen in our office today. Will prescribe nightly gabapentin for lumbar radiculopathy as d/w Dr. Gisele Hernandez. Rx escribed to pt's pharmacy.      NYDIA Javier  Orthopaedic Spine Surgery  Physician Assistant to Dr. Carmen Bridges

## 2022-03-31 ENCOUNTER — HOSPITAL ENCOUNTER (EMERGENCY)
Age: 27
Discharge: HOME | End: 2022-03-31
Attending: STUDENT IN AN ORGANIZED HEALTH CARE EDUCATION/TRAINING PROGRAM
Payer: COMMERCIAL

## 2022-03-31 VITALS
SYSTOLIC BLOOD PRESSURE: 147 MMHG | OXYGEN SATURATION: 99 % | DIASTOLIC BLOOD PRESSURE: 78 MMHG | BODY MASS INDEX: 47.09 KG/M2 | WEIGHT: 293 LBS | TEMPERATURE: 97.7 F | RESPIRATION RATE: 16 BRPM | HEART RATE: 95 BPM | HEIGHT: 66 IN

## 2022-03-31 DIAGNOSIS — M54.50 ACUTE RIGHT-SIDED LOW BACK PAIN WITHOUT SCIATICA: Primary | ICD-10-CM

## 2022-03-31 PROCEDURE — 74011250637 HC RX REV CODE- 250/637: Performed by: PHYSICIAN ASSISTANT

## 2022-03-31 PROCEDURE — 74011250636 HC RX REV CODE- 250/636: Performed by: PHYSICIAN ASSISTANT

## 2022-03-31 PROCEDURE — 99284 EMERGENCY DEPT VISIT MOD MDM: CPT

## 2022-03-31 PROCEDURE — 96372 THER/PROPH/DIAG INJ SC/IM: CPT

## 2022-03-31 RX ORDER — KETOROLAC TROMETHAMINE 10 MG/1
10 TABLET, FILM COATED ORAL
Qty: 20 TABLET | Refills: 0 | Status: SHIPPED | OUTPATIENT
Start: 2022-03-31 | End: 2022-04-05

## 2022-03-31 RX ORDER — KETOROLAC TROMETHAMINE 10 MG/1
10 TABLET, FILM COATED ORAL
Qty: 20 TABLET | Refills: 0 | Status: SHIPPED | OUTPATIENT
Start: 2022-03-31 | End: 2022-03-31 | Stop reason: SDUPTHER

## 2022-03-31 RX ORDER — METHOCARBAMOL 500 MG/1
500 TABLET, FILM COATED ORAL 4 TIMES DAILY
Qty: 20 TABLET | Refills: 0 | Status: SHIPPED | OUTPATIENT
Start: 2022-03-31 | End: 2022-03-31 | Stop reason: SDUPTHER

## 2022-03-31 RX ORDER — KETOROLAC TROMETHAMINE 30 MG/ML
30 INJECTION, SOLUTION INTRAMUSCULAR; INTRAVENOUS
Status: COMPLETED | OUTPATIENT
Start: 2022-03-31 | End: 2022-03-31

## 2022-03-31 RX ORDER — METHOCARBAMOL 500 MG/1
500 TABLET, FILM COATED ORAL 4 TIMES DAILY
Qty: 20 TABLET | Refills: 0 | Status: SHIPPED | OUTPATIENT
Start: 2022-03-31 | End: 2022-04-05

## 2022-03-31 RX ORDER — METHOCARBAMOL 750 MG/1
750 TABLET, FILM COATED ORAL
Status: COMPLETED | OUTPATIENT
Start: 2022-03-31 | End: 2022-03-31

## 2022-03-31 RX ADMIN — METHOCARBAMOL TABLETS 750 MG: 750 TABLET, COATED ORAL at 17:00

## 2022-03-31 RX ADMIN — KETOROLAC TROMETHAMINE 30 MG: 30 INJECTION, SOLUTION INTRAMUSCULAR at 16:59

## 2022-03-31 NOTE — ED TRIAGE NOTES
Pt reports slipped disc in lower back since 2017, it improved. Reports the pain has worsened since the beginning of this week. Pt arrives to triage in wheelchair.

## 2022-03-31 NOTE — ED PROVIDER NOTES
Date of Service:  3/31/2022    Patient:  Artemio Tompkins    Chief Complaint:  Back Pain       HPI:  Artemio Tompkins is a 23 y.o.  female who presents for evaluation of right-sided lower back pain for the past couple days. Patient states pain is worsened today. Denies any known trauma or injury. Patient states history of lower back pain with sciatica. Patient denies any urinary symptoms. Patient states normal bowel movements. Past Medical History:   Diagnosis Date    Asthma     Morbid obesity with BMI of 50.0-59.9, [facility] 5/[facility]/2015       Past Surgical History:   Procedure Laterality Date    HX HEENT      keloid removal         Family History:   Problem Relation Age of Onset    Depression Sister        Social History     Socioeconomic History    Marital status: SINGLE     Spouse name: Not on file    Number of children: Not on file    Years of education: Not on file    Highest education level: Not on file   Occupational History    Not on file   Tobacco Use    Smoking status: Never Smoker    Smokeless tobacco: Never Used   Substance and Sexual Activity    Alcohol use: No    Drug use: No    Sexual activity: Not on file   Other Topics Concern    Not on file   Social History Narrative    Not on file     Social Determinants of Health     Financial Resource Strain:     Difficulty of Paying Living Expenses: Not on file   Food Insecurity:     Worried About Running Out of Food in the Last Year: Not on file    Marlin of Food in the Last Year: Not on file   Transportation Needs:     Lack of Transportation (Medical): Not on file    Lack of Transportation (Non-Medical):  Not on file   Physical Activity:     Days of Exercise per Week: Not on file    Minutes of Exercise per Session: Not on file   Stress:     Feeling of Stress : Not on file   Social Connections:     Frequency of Communication with Friends and Family: Not on file    Frequency of Social Gatherings with Friends and Family: Not on file    Attends Gnosticism Services: Not on file    Active Member of Clubs or Organizations: Not on file    Attends Club or Organization Meetings: Not on file    Marital Status: Not on file   Intimate Partner Violence:     Fear of Current or Ex-Partner: Not on file    Emotionally Abused: Not on file    Physically Abused: Not on file    Sexually Abused: Not on file   Housing Stability:     Unable to Pay for Housing in the Last Year: Not on file    Number of [place] in the Last Year: Not on file    Unstable Housing in the Last Year: Not on file         ALLERGIES: Codeine    Review of Systems   Constitutional: Negative for chills and fever. Respiratory: Negative for shortness of breath. Cardiovascular: Negative for chest pain. Gastrointestinal: Negative for abdominal pain. Genitourinary: Negative for difficulty urinating, dysuria and frequency. Musculoskeletal: Positive for back pain. Skin: Negative for rash. Allergic/Immunologic: Negative for immunocompromised state. Neurological: Negative for headaches. Psychiatric/Behavioral: Negative for agitation. All other systems reviewed and are negative. Vitals:    03/31/22 1639   BP: (!) 147/78   Pulse: 95   Resp: 16   Temp: 97.7 °F (36.5 °C)   SpO2: 99%   Weight: (!) 188.2 kg (415 lb)   Height: 5' 6\" (1.676 m)            Physical Exam  Vitals and nursing note reviewed. Constitutional:       General: She is not in acute distress. Cardiovascular:      Rate and Rhythm: Normal rate and regular rhythm. Heart sounds: No murmur heard. Pulmonary:      Effort: Pulmonary effort is normal.   Abdominal:      Palpations: Abdomen is soft. Tenderness: There is no abdominal tenderness. Musculoskeletal:         General: Tenderness present. No signs of injury. Normal range of motion. Cervical back: Normal range of motion. Comments: TTP to right lower back. Limited ROM due to pain   Skin:     General: Skin is warm. Neurological:      Mental Status: She is alert and oriented to person, place, and time. Mental status is at baseline. MDM       Procedures        VITAL SIGNS:  No data found. LABS:  No results found for this or any previous visit (from the past 6 hour(s)). IMAGING:  No orders to display         Medications During Visit:  Medications   ketorolac (TORADOL) injection 30 mg (30 mg IntraMUSCular Given 3/31/22 9281)   methocarbamoL (ROBAXIN) tablet 750 mg (750 mg Oral Given 3/31/22 1700)         DECISION MAKING:  Jefferson Osborne is a 23 y.o. female who comes in as above. Discussed with patient symptoms are likely due to chronic lower right-sided back pain. Patient instructed to follow-up with [address] [place] [address]. Patient agreed to plan of care. Stable upon discharge. Return precautions given to patient. IMPRESSION:  1. Acute right-sided low back pain without sciatica        DISPOSITION:  Discharged      Discharge Medication List as of 3/31/2022  5:16 PM      START taking these medications    Details   ketorolac (TORADOL) 10 mg tablet Take 1 Tablet by mouth every six (6) hours as needed for Pain for up to 5 days. , Normal, Disp-20 Tablet, R-0      methocarbamoL (Robaxin) 500 mg tablet Take 1 Tablet by mouth four (4) times daily for 5 days. As needed for muscle spasms, Normal, Disp-20 Tablet, R-0         CONTINUE these medications which have NOT CHANGED    Details   gabapentin (NEURONTIN) 300 mg capsule Take 1 Cap by mouth nightly. Max Daily Amount: 300 mg., Normal, Disp-30 Cap, R-1      methylPREDNISolone (Medrol, Jeff,) 4 mg tablet As directed, Normal, Disp-1 Dose Pack, R-0      cyclobenzaprine (FLEXERIL) 10 mg tablet Take 1 Tab by mouth three (3) times daily as needed for Muscle Spasm(s). , Normal, Disp-20 Tab, R-0      naproxen (NAPROSYN) 500 mg tablet Take 1 Tab by mouth two (2) times daily (with meals). , Normal, Disp-30 Tab, R-0      metFORMIN (GLUCOPHAGE) 850 mg tablet Historical Med doxycycline (VIBRAMYCIN) 100 mg capsule Historical Med      ibuprofen (MOTRIN) 800 mg tablet Take 1 Tab by mouth every eight (8) hours as needed for Pain (take with food). , Print, Disp-20 Tab, R-0      norgestimate-ethinyl estradiol ([facility], ,) 0.25-35 mg-mcg tab Take 1 Tab by mouth daily. , Historical Med      albuterol (PROVENTIL HFA, VENTOLIN HFA, PROAIR HFA) 90 mcg/actuation inhaler Take 2 Puffs by inhalation every six (6) hours as needed for Wheezing., Normal, Disp-1 Inhaler, R-1              Follow-up Information     Follow up With Specialties Details Why Contact Info    [facility]  Schedule an appointment as soon as possible for a visit   [address] [place] [address]  [phone]    [facility] EMERGENCY DEPT Emergency Medicine  If symptoms worsen [address] [place] [address]  [phone]

## 2022-11-07 ENCOUNTER — APPOINTMENT (OUTPATIENT)
Dept: GENERAL RADIOLOGY | Age: 27
End: 2022-11-07
Attending: NURSE PRACTITIONER
Payer: COMMERCIAL

## 2022-11-07 ENCOUNTER — HOSPITAL ENCOUNTER (EMERGENCY)
Age: 27
Discharge: HOME | End: 2022-11-07
Attending: EMERGENCY MEDICINE
Payer: COMMERCIAL

## 2022-11-07 VITALS
BODY MASS INDEX: 47.09 KG/M2 | TEMPERATURE: 100.8 F | WEIGHT: 293 LBS | HEIGHT: 66 IN | HEART RATE: 116 BPM | RESPIRATION RATE: 18 BRPM | OXYGEN SATURATION: 97 % | DIASTOLIC BLOOD PRESSURE: 5 MMHG | SYSTOLIC BLOOD PRESSURE: 120 MMHG

## 2022-11-07 DIAGNOSIS — J11.1 FLU: Primary | ICD-10-CM

## 2022-11-07 LAB
FLUAV AG NPH QL IA: POSITIVE
FLUBV AG NOSE QL IA: NEGATIVE

## 2022-11-07 PROCEDURE — 74011250636 HC RX REV CODE- 250/636: Performed by: NURSE PRACTITIONER

## 2022-11-07 PROCEDURE — 99284 EMERGENCY DEPT VISIT MOD MDM: CPT

## 2022-11-07 PROCEDURE — 71046 X-RAY EXAM CHEST 2 VIEWS: CPT

## 2022-11-07 PROCEDURE — 87804 INFLUENZA ASSAY W/OPTIC: CPT

## 2022-11-07 RX ORDER — SODIUM CHLORIDE 9 MG/ML
999 INJECTION, SOLUTION INTRAVENOUS ONCE
Status: COMPLETED | OUTPATIENT
Start: 2022-11-07 | End: 2022-11-07

## 2022-11-07 RX ORDER — KETOROLAC TROMETHAMINE 30 MG/ML
30 INJECTION, SOLUTION INTRAMUSCULAR; INTRAVENOUS
Status: DISCONTINUED | OUTPATIENT
Start: 2022-11-07 | End: 2022-11-07 | Stop reason: HOSPADM

## 2022-11-07 RX ADMIN — SODIUM CHLORIDE 999 ML: 9 INJECTION, SOLUTION INTRAVENOUS at 13:33

## 2022-11-07 NOTE — Clinical Note
[address]  [facility] EMERGENCY DEPT  [phone]  [phone]    Work/School Note    Date: 11/7/2022    To Whom It May concern:    Di Abdi was seen and treated today in the emergency room by the following provider(s):  Attending Provider: Sandra Nguyen MD  Nurse Practitioner: CELSA Marquez. Di Abdi is excused from work/school on 11/7/2022 through 11/9/2022. She is medically clear to return to work/school on 11/10/2022.          Sincerely,          CELSA Sanz

## 2022-11-07 NOTE — DISCHARGE INSTRUCTIONS
Coming to the emergency room today. You are flu a positive. Please not get your flu vaccine for 2 weeks. Follow-up with primary care as needed. Price (Do Not Change): 0.00 Detail Level: Simple Instructions: This plan will send the code FBSD to the PM system.  DO NOT or CHANGE the price.

## 2022-11-07 NOTE — ED TRIAGE NOTES
Patient presents with concern for Flu, saw dispatch health yesterday and tested negative for covid, strep and flu.  Has had fevers and headaches with decreased appetite and occasional episodes of vomiting since Saturday       Took advil and \" a flu prescription\" at 0800

## 2023-05-20 RX ORDER — CYCLOBENZAPRINE HCL 10 MG
10 TABLET ORAL 3 TIMES DAILY PRN
COMMUNITY
Start: 2021-02-17

## 2023-05-20 RX ORDER — IBUPROFEN 800 MG/1
800 TABLET ORAL EVERY 8 HOURS PRN
COMMUNITY
Start: 2019-04-25

## 2023-05-20 RX ORDER — NAPROXEN 500 MG/1
500 TABLET ORAL 2 TIMES DAILY WITH MEALS
COMMUNITY
Start: 2021-02-17

## 2023-05-20 RX ORDER — METHYLPREDNISOLONE 4 MG/1
TABLET ORAL
COMMUNITY
Start: 2021-02-17

## 2023-05-20 RX ORDER — GABAPENTIN 300 MG/1
300 CAPSULE ORAL
COMMUNITY
Start: 2021-03-05

## 2023-05-20 RX ORDER — DOXYCYCLINE HYCLATE 100 MG/1
CAPSULE ORAL
COMMUNITY
Start: 2021-01-13

## 2023-05-20 RX ORDER — ALBUTEROL SULFATE 90 UG/1
2 AEROSOL, METERED RESPIRATORY (INHALATION) EVERY 6 HOURS PRN
COMMUNITY
Start: 2016-02-14

## 2023-05-20 RX ORDER — NORGESTIMATE AND ETHINYL ESTRADIOL 0.25-0.035
1 KIT ORAL DAILY
COMMUNITY

## 2023-08-11 ENCOUNTER — HOSPITAL ENCOUNTER (EMERGENCY)
Facility: HOSPITAL | Age: 28
Discharge: HOME | End: 2023-08-12
Attending: EMERGENCY MEDICINE
Payer: COMMERCIAL

## 2023-08-11 VITALS
OXYGEN SATURATION: 99 % | HEART RATE: 95 BPM | SYSTOLIC BLOOD PRESSURE: 154 MMHG | RESPIRATION RATE: 19 BRPM | DIASTOLIC BLOOD PRESSURE: 87 MMHG | HEIGHT: 66 IN | WEIGHT: 293 LBS | BODY MASS INDEX: 47.09 KG/M2 | TEMPERATURE: 98 F

## 2023-08-11 DIAGNOSIS — N93.8 DYSFUNCTIONAL UTERINE BLEEDING: Primary | ICD-10-CM

## 2023-08-11 LAB
ALBUMIN SERPL-MCNC: 3.7 G/DL (ref 3.5–5)
ALBUMIN/GLOB SERPL: 1 (ref 1.1–2.2)
ALP SERPL-CCNC: 58 U/L (ref 45–117)
ALT SERPL-CCNC: 150 U/L (ref 12–78)
ANION GAP SERPL CALC-SCNC: 5 MMOL/L (ref 5–15)
AST SERPL-CCNC: 42 U/L (ref 15–37)
BASOPHILS # BLD: 0.1 K/UL (ref 0–0.1)
BASOPHILS NFR BLD: 1 % (ref 0–1)
BILIRUB SERPL-MCNC: 0.3 MG/DL (ref 0.2–1)
BUN SERPL-MCNC: 10 MG/DL (ref 6–20)
BUN/CREAT SERPL: 10 (ref 12–20)
CALCIUM SERPL-MCNC: 9.2 MG/DL (ref 8.5–10.1)
CHLORIDE SERPL-SCNC: 110 MMOL/L (ref 97–108)
CO2 SERPL-SCNC: 25 MMOL/L (ref 21–32)
COMMENT:: NORMAL
CREAT SERPL-MCNC: 0.96 MG/DL (ref 0.55–1.02)
DIFFERENTIAL METHOD BLD: NORMAL
EOSINOPHIL # BLD: 0.1 K/UL (ref 0–0.4)
EOSINOPHIL NFR BLD: 1 % (ref 0–7)
ERYTHROCYTE [DISTWIDTH] IN BLOOD BY AUTOMATED COUNT: 13 % (ref 11.5–14.5)
GLOBULIN SER CALC-MCNC: 3.6 G/DL (ref 2–4)
GLUCOSE SERPL-MCNC: 124 MG/DL (ref 65–100)
HCG SERPL QL: NEGATIVE
HCT VFR BLD AUTO: 36.1 % (ref 35–47)
HGB BLD-MCNC: 11.9 G/DL (ref 11.5–16)
IMM GRANULOCYTES # BLD AUTO: 0 K/UL (ref 0–0.04)
IMM GRANULOCYTES NFR BLD AUTO: 0 % (ref 0–0.5)
INR PPP: 1 (ref 0.9–1.1)
LYMPHOCYTES # BLD: 3.2 K/UL (ref 0.8–3.5)
LYMPHOCYTES NFR BLD: 42 % (ref 12–49)
MCH RBC QN AUTO: 30.9 PG (ref 26–34)
MCHC RBC AUTO-ENTMCNC: 33 G/DL (ref 30–36.5)
MCV RBC AUTO: 93.8 FL (ref 80–99)
MONOCYTES # BLD: 0.5 K/UL (ref 0–1)
MONOCYTES NFR BLD: 6 % (ref 5–13)
NEUTS SEG # BLD: 3.7 K/UL (ref 1.8–8)
NEUTS SEG NFR BLD: 50 % (ref 32–75)
NRBC # BLD: 0 K/UL (ref 0–0.01)
NRBC BLD-RTO: 0 PER 100 WBC
PLATELET # BLD AUTO: 329 K/UL (ref 150–400)
PMV BLD AUTO: 11 FL (ref 8.9–12.9)
POTASSIUM SERPL-SCNC: 3.9 MMOL/L (ref 3.5–5.1)
PROT SERPL-MCNC: 7.3 G/DL (ref 6.4–8.2)
PROTHROMBIN TIME: 10.5 SEC (ref 9–11.1)
RBC # BLD AUTO: 3.85 M/UL (ref 3.8–5.2)
SODIUM SERPL-SCNC: 140 MMOL/L (ref 136–145)
SPECIMEN HOLD: NORMAL
WBC # BLD AUTO: 7.5 K/UL (ref 3.6–11)

## 2023-08-11 PROCEDURE — 2580000003 HC RX 258: Performed by: EMERGENCY MEDICINE

## 2023-08-11 PROCEDURE — 85025 COMPLETE CBC W/AUTO DIFF WBC: CPT

## 2023-08-11 PROCEDURE — 85610 PROTHROMBIN TIME: CPT

## 2023-08-11 PROCEDURE — 99284 EMERGENCY DEPT VISIT MOD MDM: CPT

## 2023-08-11 PROCEDURE — 80053 COMPREHEN METABOLIC PANEL: CPT

## 2023-08-11 PROCEDURE — 84703 CHORIONIC GONADOTROPIN ASSAY: CPT

## 2023-08-11 RX ORDER — 0.9 % SODIUM CHLORIDE 0.9 %
1000 INTRAVENOUS SOLUTION INTRAVENOUS ONCE
Status: COMPLETED | OUTPATIENT
Start: 2023-08-11 | End: 2023-08-12

## 2023-08-11 RX ADMIN — SODIUM CHLORIDE 1000 ML: 9 INJECTION, SOLUTION INTRAVENOUS at 22:32

## 2023-08-11 ASSESSMENT — PAIN - FUNCTIONAL ASSESSMENT: PAIN_FUNCTIONAL_ASSESSMENT: 0-10

## 2023-08-11 ASSESSMENT — PAIN DESCRIPTION - LOCATION: LOCATION: PELVIS

## 2023-08-11 ASSESSMENT — PAIN SCALES - GENERAL: PAINLEVEL_OUTOF10: 4

## 2023-08-12 ENCOUNTER — APPOINTMENT (OUTPATIENT)
Facility: HOSPITAL | Age: 28
End: 2023-08-12
Payer: COMMERCIAL

## 2023-08-12 PROCEDURE — 76830 TRANSVAGINAL US NON-OB: CPT

## 2023-08-12 PROCEDURE — 6370000000 HC RX 637 (ALT 250 FOR IP): Performed by: EMERGENCY MEDICINE

## 2023-08-12 PROCEDURE — 76856 US EXAM PELVIC COMPLETE: CPT

## 2023-08-12 RX ORDER — MEDROXYPROGESTERONE ACETATE 2.5 MG/1
10 TABLET ORAL DAILY
Qty: 40 TABLET | Refills: 0 | Status: SHIPPED | OUTPATIENT
Start: 2023-08-12 | End: 2023-08-22

## 2023-08-12 RX ORDER — MEDROXYPROGESTERONE ACETATE 10 MG/1
10 TABLET ORAL ONCE
Status: COMPLETED | OUTPATIENT
Start: 2023-08-12 | End: 2023-08-12

## 2023-08-12 RX ADMIN — MEDROXYPROGESTERONE ACETATE 10 MG: 10 TABLET ORAL at 02:38

## 2023-08-12 NOTE — ED TRIAGE NOTES
Patient arrived ambulatory via POV. Patient stated her period is way heavier than normal. Patient stated she is bleeding through a super plus tampon and thick pad 3 times per hour over the past 1 week. Patient follows with [facility], Dr. Mikael Anna.      Patient c/o dizziness and worse pelvic cramps than normal.     Patient stated this happened a few years ago and was started on birth control to control symptoms

## 2023-08-12 NOTE — DISCHARGE INSTRUCTIONS
Your ultrasound showed a fibroid which could be the cause of the heavy bleeding. Please take the Provera once a day for 10 days. Call the office on Monday for Dr. Kindra Liriano to be seen by any practitioner for reevaluation of your symptoms. Thank you.

## 2023-08-12 NOTE — ED PROVIDER NOTES
[facility] EMERGENCY DEPT  EMERGENCY DEPARTMENT ENCOUNTER      Pt Name: Cecile Mills  MRN: [id number]  [address] [facility] 1995  Date of evaluation: 8/11/2023  Provider: James Krause MD    [address]       Chief Complaint   Patient presents with    Vaginal Bleeding         HISTORY OF PRESENT ILLNESS   (Location/Symptom, Timing/Onset, Context/Setting, Quality, Duration, Modifying Factors, Severity)  Note limiting factors. Patient is a 20-year-old female with history of asthma, irregular periods on birth control pills who presents with 1 month history of heavy vaginal bleeding. Patient reports she is wearing multiple tampons and pads all month. Denies any lightheadedness, fatigue. Reports that she follows with Dr. Clarissa Cummings at [facility], but when she called this week there was no appointments for Thursday and Friday. Patient denies any prior history of anemia requiring blood transfusions. Notes that heavy bleeding did not start until she was started on birth control pills. The history is provided by the patient. Nursing Notes were reviewed.     REVIEW OF SYSTEMS    Not Required     Review of Systems    PAST MEDICAL HISTORY     Past Medical History:   Diagnosis Date    Asthma     Morbid obesity with BMI of 50.0-59.9, adult ([address]) 5/4/2015       SURGICAL HISTORY       Past Surgical History:   Procedure Laterality Date    HEENT      keloid removal       CURRENT MEDICATIONS       Discharge Medication List as of 8/12/2023  2:39 AM        CONTINUE these medications which have NOT CHANGED    Details   albuterol sulfate HFA (PROVENTIL;VENTOLIN;PROAIR) 108 (90 Base) MCG/ACT inhaler Inhale 2 puffs into the lungs every 6 hours as neededHistorical Med      cyclobenzaprine (FLEXERIL) 10 MG tablet Take 1 tablet by mouth 3 times daily as neededHistorical Med      doxycycline hyclate (VIBRAMYCIN) 100 MG capsule ceived the following from [facility]: Outside name: doxycycline (VIBRAMYCIN) Emergency Room should their condition change prior to follow-up. Discharge instructions have also been provided to the patient with some educational information regarding their diagnosis as well a list of reasons why they would want to return to the ER prior to their follow-up appointment should their condition change.       (Please note that portions of this note were completed with a voice recognition program.  Efforts were made to edit the dictations but occasionally words are mis-transcribed.)    Alana Lan MD (electronically signed)  Emergency Attending Physician          Alana Lan MD  08/12/23 2414

## 2023-11-26 ENCOUNTER — OFFICE VISIT (OUTPATIENT)
Age: 28
End: 2023-11-26

## 2023-11-26 VITALS
SYSTOLIC BLOOD PRESSURE: 134 MMHG | HEART RATE: 83 BPM | TEMPERATURE: 98.8 F | WEIGHT: 293 LBS | HEIGHT: 66 IN | RESPIRATION RATE: 18 BRPM | OXYGEN SATURATION: 97 % | BODY MASS INDEX: 47.09 KG/M2 | DIASTOLIC BLOOD PRESSURE: 83 MMHG

## 2023-11-26 DIAGNOSIS — A08.4 VIRAL GASTROENTERITIS: Primary | ICD-10-CM

## 2023-11-26 DIAGNOSIS — R11.2 NAUSEA AND VOMITING, UNSPECIFIED VOMITING TYPE: ICD-10-CM

## 2023-11-26 LAB
INFLUENZA A ANTIGEN, POC: NEGATIVE
INFLUENZA B ANTIGEN, POC: NEGATIVE
VALID INTERNAL CONTROL, POC: YES

## 2023-11-26 RX ORDER — DICYCLOMINE HYDROCHLORIDE 10 MG/1
10 CAPSULE ORAL 3 TIMES DAILY PRN
Qty: 20 CAPSULE | Refills: 0 | Status: SHIPPED | OUTPATIENT
Start: 2023-11-26

## 2023-11-26 RX ORDER — ONDANSETRON 4 MG/1
4 TABLET, ORALLY DISINTEGRATING ORAL 3 TIMES DAILY PRN
Qty: 10 TABLET | Refills: 0 | Status: SHIPPED | OUTPATIENT
Start: 2023-11-26

## 2023-11-26 ASSESSMENT — ENCOUNTER SYMPTOMS
NAUSEA: 1
ABDOMINAL PAIN: 0
EYES NEGATIVE: 1
BLOOD IN STOOL: 0
RESPIRATORY NEGATIVE: 1
DIARRHEA: 1
VOMITING: 1
ALLERGIC/IMMUNOLOGIC NEGATIVE: 1

## 2024-02-03 ENCOUNTER — OFFICE VISIT (OUTPATIENT)
Age: 29
End: 2024-02-03

## 2024-02-03 VITALS
TEMPERATURE: 99.2 F | BODY MASS INDEX: 66.5 KG/M2 | DIASTOLIC BLOOD PRESSURE: 75 MMHG | SYSTOLIC BLOOD PRESSURE: 135 MMHG | OXYGEN SATURATION: 98 % | WEIGHT: 293 LBS | HEART RATE: 89 BPM

## 2024-02-03 DIAGNOSIS — H01.004 BLEPHARITIS OF UPPER EYELIDS OF BOTH EYES, UNSPECIFIED TYPE: ICD-10-CM

## 2024-02-03 DIAGNOSIS — J02.0 STREPTOCOCCAL SORE THROAT: Primary | ICD-10-CM

## 2024-02-03 DIAGNOSIS — H01.001 BLEPHARITIS OF UPPER EYELIDS OF BOTH EYES, UNSPECIFIED TYPE: ICD-10-CM

## 2024-02-03 LAB
GROUP A STREP ANTIGEN, POC: POSITIVE
VALID INTERNAL CONTROL, POC: YES

## 2024-02-03 RX ORDER — AMOXICILLIN 500 MG/1
500 CAPSULE ORAL 2 TIMES DAILY
Qty: 20 CAPSULE | Refills: 0 | Status: SHIPPED | OUTPATIENT
Start: 2024-02-03 | End: 2024-02-13

## 2024-02-03 RX ORDER — ERYTHROMYCIN 5 MG/G
OINTMENT OPHTHALMIC EVERY 6 HOURS
Qty: 1 G | Refills: 0 | Status: SHIPPED | OUTPATIENT
Start: 2024-02-03 | End: 2024-02-10

## 2024-02-03 ASSESSMENT — ENCOUNTER SYMPTOMS
PHOTOPHOBIA: 0
EYE REDNESS: 1
SORE THROAT: 1
EYE ITCHING: 1
EYE DISCHARGE: 1
EYE PAIN: 1

## 2024-02-03 NOTE — PATIENT INSTRUCTIONS
Please follow up with your PCP in 2-5 days.    Go to the ER for worsening or persistent symptoms, changes in vision, severe headache with nausea, or pain with eye movement.    Good lid hygiene is the mainstay of treatment for all forms of blepharitis.    -Eliminate or limit potential triggers or exacerbating factors (eg, allergens, cigarette smoking).  Contact lenses may continue to be worn if comfortable.  -The management of contact (allergic) blepharitis consists of eliminating use of the offending agent (eg, cosmetics).  Be vigilant about removing makeup at night, cleaning applicators, and avoiding old or  products.    Mild to moderate symptoms:    -warm compresses, lid massage, lid washing and artificial tears  Put warm wet pressure on your eyes - wet a clean wash cloth with warm (not scalding hot) water and put it over your eyes.  When the wash cloth cools, reheat it with warm water and put it back over your eyes.  Repeat these steps for 5 minutes, 2 to 4 times a day.  Gently rub your eyelids - Do this right after putting warm, wet pressure on your eyes.  Use the washcloth or a clean fingertip to gently rub your eyelid in small circles.  Wash your eyelids - Use plain warm water or warm water with a drop of baby shampoo on a clean washcloth, gauze pad, or cotton swab.  Gently clean any crusty material off the eyelashes and eyelids.  Do not rub hard or you can cause more irritation.  You can also use over-the-counter eyelid scrubs and pads  ___________________________________________________     Important:  **Change toothbrush after 24 hour of antibiotic use**  Avoid kissing or sharing beverages/utensils until all symptoms resolve    Please follow-up with the PCP within 2-5 days if your  signs and symptoms have not resolved or worsened.    Please go immediately to the Emergency Department if you develop difficulty swallowing/breathing, respiratory distress, hoarse voice, drooling, difficulty opening jaw,

## 2024-02-03 NOTE — PROGRESS NOTES
Subjective     Chief Complaint   Patient presents with    Eye Problem     Sx started ~1 month ago, bilateral eye itchiness, redness and irritation and yellowish discharge. Sore throat ~3 weeks ago, started getting better but recently getting worse, coughing, congestion.        Patient ID:  Amanda Brush is a 28 y.o. female.    Patient is 28 year old female presenting with eye itching, redness and discharge for one month.  She has been using generic eye drops and states it has gotten worse.  She has discharge from eyes in the morning. Denies any visual disturbances.  Denies wearing contact lenses.   Sore throat began 2-3 weeks ago.  Denies changes in appetite.  States it hurts to swallow.  Denies fever or chills.       Eye Problem   Associated symptoms include an eye discharge, eye redness and itching. Pertinent negatives include no fever or photophobia.       Review of Systems   Constitutional:  Negative for chills and fever.   HENT:  Positive for sore throat.    Eyes:  Positive for pain, discharge, redness and itching. Negative for photophobia and visual disturbance.       Past Medical History:   Diagnosis Date    Asthma     Morbid obesity with BMI of 50.0-59.9, adult ([facility]) 5/4/2015       Past Surgical History:   Procedure Laterality Date    HEENT      keloid removal       Family History   Problem Relation Age of Onset    Depression Sister        Allergies   Allergen Reactions    Codeine Itching     Very somnolent.       Social History     Tobacco Use    Smoking status: Never    Smokeless tobacco: Never   Substance Use Topics    Alcohol use: No    Drug use: Never       Objective   Vitals:    02/03/24 1828   BP: 135/75   Pulse: 89   Temp: 99.2 °F (37.3 °C)   SpO2: 98%     Physical Exam  Constitutional:       General: She is not in acute distress.     Appearance: Normal appearance. She is not ill-appearing.   HENT:      Head: Normocephalic and atraumatic.      Mouth/Throat:      Mouth: Mucous membranes are moist.

## 2025-03-28 NOTE — ED PROVIDER NOTES
21-year-old female in with fever. Past medical history. States she had a low-grade fever on Saturday and progressively got higher on Sunday she had dispatch health come out to her house she was tested for COVID flu and strep which all came back negative. She has been taking Advil at home. Past Medical History:   Diagnosis Date    Asthma     Morbid obesity with BMI of 50.0-59.9, adult ([facility] 5/4/2015       Past Surgical History:   Procedure Laterality Date    HX HEENT      keloid removal         Family History:   Problem Relation Age of Onset    Depression Sister        Social History     Socioeconomic History    Marital status: SINGLE     Spouse name: Not on file    Number of children: Not on file    Years of education: Not on file    Highest education level: Not on file   Occupational History    Not on file   Tobacco Use    Smoking status: Never    Smokeless tobacco: Never   Substance and Sexual Activity    Alcohol use: No    Drug use: No    Sexual activity: Not on file   Other Topics Concern    Not on file   Social History Narrative    Not on file     Social Determinants of Health     Financial Resource Strain: Not on file   Food Insecurity: Not on file   Transportation Needs: Not on file   Physical Activity: Not on file   Stress: Not on file   Social Connections: Not on file   Intimate Partner Violence: Not on file   Housing Stability: Not on file         ALLERGIES: Codeine    Review of Systems   Constitutional:  Positive for activity change, chills, fatigue and fever. Negative for appetite change, diaphoresis and unexpected weight change. HENT:  Positive for congestion and sore throat. Negative for ear pain, facial swelling, hearing loss, mouth sores, rhinorrhea, sinus pain, trouble swallowing and voice change. Eyes:  Negative for pain, discharge, redness and itching. Respiratory:  Positive for cough. Negative for apnea, choking, chest tightness and shortness of breath.     Cardiovascular: Negative for chest pain, palpitations and leg swelling. Gastrointestinal:  Negative for abdominal distention, abdominal pain, blood in stool, diarrhea and nausea. Endocrine: Negative for cold intolerance, heat intolerance and polydipsia. Genitourinary:  Negative for decreased urine volume, difficulty urinating, frequency and hematuria. Musculoskeletal:  Positive for myalgias. Negative for arthralgias, neck pain and neck stiffness. Skin:  Negative for color change, pallor and rash. Allergic/Immunologic: Negative for environmental allergies. Neurological:  Negative for dizziness, seizures, syncope, facial asymmetry and light-headedness. Hematological:  Negative for adenopathy. Psychiatric/Behavioral:  Negative for agitation, confusion and sleep disturbance. Vitals:    11/07/22 1238   BP: 102/80   Pulse: (!) 131   Resp: 18   Temp: (!) 102.9 °F (39.4 °C)   SpO2: 96%   Weight: (!) 175.5 kg (387 lb)   Height: 5' 6\" (1.676 m)            Physical Exam  Constitutional:       Appearance: Normal appearance. She is obese. HENT:      Head: Normocephalic and atraumatic. Nose: Nose normal.      Mouth/Throat:      Mouth: Mucous membranes are moist.   Eyes:      Extraocular Movements: Extraocular movements intact. Conjunctiva/sclera: Conjunctivae normal.      Pupils: Pupils are equal, round, and reactive to light. Cardiovascular:      Rate and Rhythm: Regular rhythm. Tachycardia present. Pulses: Normal pulses. Heart sounds: Normal heart sounds. Pulmonary:      Effort: Pulmonary effort is normal.      Breath sounds: Normal breath sounds. Abdominal:      General: Abdomen is flat. Bowel sounds are normal.      Palpations: Abdomen is soft. Musculoskeletal:      Cervical back: Normal range of motion and neck supple. Skin:     General: Skin is warm and dry. Capillary Refill: Capillary refill takes less than 2 seconds. Neurological:      General: No focal deficit present. Mental Status: She is alert and oriented to person, place, and time. Psychiatric:         Mood and Affect: Mood normal.         Behavior: Behavior normal.        MDM  Number of Diagnoses or Management Options  Diagnosis management comments: 27-year-old female with flulike symptoms. She is flu a positive. Treatment today was IV fluids Toradol. Will discharge with instructions to take Tylenol Motrin and increase fluids.        Amount and/or Complexity of Data Reviewed  Clinical lab tests: reviewed  Tests in the radiology section of CPT®: reviewed           Procedures Patient expressed no known problems or needs